# Patient Record
Sex: FEMALE | Race: OTHER | NOT HISPANIC OR LATINO | URBAN - METROPOLITAN AREA
[De-identification: names, ages, dates, MRNs, and addresses within clinical notes are randomized per-mention and may not be internally consistent; named-entity substitution may affect disease eponyms.]

---

## 2019-05-18 ENCOUNTER — EMERGENCY (EMERGENCY)
Facility: HOSPITAL | Age: 19
LOS: 1 days | Discharge: HOME | End: 2019-05-18
Attending: EMERGENCY MEDICINE | Admitting: EMERGENCY MEDICINE
Payer: COMMERCIAL

## 2019-05-18 VITALS
TEMPERATURE: 98 F | OXYGEN SATURATION: 99 % | RESPIRATION RATE: 16 BRPM | HEART RATE: 69 BPM | DIASTOLIC BLOOD PRESSURE: 71 MMHG | SYSTOLIC BLOOD PRESSURE: 118 MMHG

## 2019-05-18 DIAGNOSIS — Z04.89 ENCOUNTER FOR EXAMINATION AND OBSERVATION FOR OTHER SPECIFIED REASONS: ICD-10-CM

## 2019-05-18 DIAGNOSIS — Z04.41 ENCOUNTER FOR EXAMINATION AND OBSERVATION FOLLOWING ALLEGED ADULT RAPE: ICD-10-CM

## 2019-05-18 LAB
APPEARANCE UR: CLEAR — SIGNIFICANT CHANGE UP
BILIRUB UR-MCNC: NEGATIVE — SIGNIFICANT CHANGE UP
COLOR SPEC: YELLOW — SIGNIFICANT CHANGE UP
DIFF PNL FLD: ABNORMAL
GLUCOSE UR QL: NEGATIVE — SIGNIFICANT CHANGE UP
HCG UR QL: NEGATIVE — SIGNIFICANT CHANGE UP
KETONES UR-MCNC: NEGATIVE — SIGNIFICANT CHANGE UP
LEUKOCYTE ESTERASE UR-ACNC: NEGATIVE — SIGNIFICANT CHANGE UP
NITRITE UR-MCNC: NEGATIVE — SIGNIFICANT CHANGE UP
PH UR: 6.5 — SIGNIFICANT CHANGE UP (ref 5–8)
PROT UR-MCNC: NEGATIVE MG/DL — SIGNIFICANT CHANGE UP
SP GR SPEC: <=1.005 — SIGNIFICANT CHANGE UP (ref 1–1.03)
UROBILINOGEN FLD QL: 0.2 E.U./DL — SIGNIFICANT CHANGE UP

## 2019-05-18 PROCEDURE — 99283 EMERGENCY DEPT VISIT LOW MDM: CPT
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
## 2019-05-18 NOTE — ED PROVIDER NOTE - CHPI ED SYMPTOMS NEG
no chest pain/no weakness/no blurred vision/no abrasion/no back pain/no chest wall tenderness/no vomiting
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
## 2020-12-15 ENCOUNTER — EMERGENCY (EMERGENCY)
Facility: HOSPITAL | Age: 20
LOS: 1 days | Discharge: HOME | End: 2020-12-15
Admitting: EMERGENCY MEDICINE
Payer: COMMERCIAL

## 2020-12-15 VITALS
TEMPERATURE: 98 F | SYSTOLIC BLOOD PRESSURE: 110 MMHG | RESPIRATION RATE: 18 BRPM | HEART RATE: 58 BPM | OXYGEN SATURATION: 98 % | HEIGHT: 63 IN | DIASTOLIC BLOOD PRESSURE: 68 MMHG | WEIGHT: 134.92 LBS

## 2020-12-15 DIAGNOSIS — Z20.828 CONTACT WITH AND (SUSPECTED) EXPOSURE TO OTHER VIRAL COMMUNICABLE DISEASES: ICD-10-CM

## 2020-12-15 PROBLEM — Z78.9 OTHER SPECIFIED HEALTH STATUS: Chronic | Status: ACTIVE | Noted: 2019-05-18

## 2020-12-15 PROCEDURE — 99283 EMERGENCY DEPT VISIT LOW MDM: CPT
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
## 2020-12-15 NOTE — ED PROVIDER NOTE - CLINICAL SUMMARY MEDICAL DECISION MAKING FREE TEXT BOX
19 y/o F presents to the ED requesting to have screening testing done for COVID-19. Pt endorses she is asymptomatic at this time. On exam, Pt appears well and in no apparent distress. No vital sign derangements. No conversational dyspnea. Nasopharyngeal PCR has been obtained and Pt has been guided on how to obtain their results. General COVID-19 discharge instructions have been given and Pt agrees to receiving results electronically.

## 2020-12-15 NOTE — ED PROVIDER NOTE - PATIENT PORTAL LINK FT
You can access the FollowMyHealth Patient Portal offered by [facility] by registering at the following website: [email or web address]. By joining [employer]’s FollowMyHealth portal, you will also be able to view your health information using other applications (apps) compatible with our system.
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
## 2020-12-21 ENCOUNTER — EMERGENCY (EMERGENCY)
Facility: HOSPITAL | Age: 20
LOS: 1 days | Discharge: HOME | End: 2020-12-21
Attending: EMERGENCY MEDICINE | Admitting: EMERGENCY MEDICINE
Payer: COMMERCIAL

## 2020-12-21 VITALS
DIASTOLIC BLOOD PRESSURE: 71 MMHG | WEIGHT: 134.92 LBS | OXYGEN SATURATION: 98 % | TEMPERATURE: 98 F | HEART RATE: 75 BPM | SYSTOLIC BLOOD PRESSURE: 109 MMHG | RESPIRATION RATE: 18 BRPM | HEIGHT: 63 IN

## 2020-12-21 DIAGNOSIS — Z20.828 CONTACT WITH AND (SUSPECTED) EXPOSURE TO OTHER VIRAL COMMUNICABLE DISEASES: ICD-10-CM

## 2020-12-21 PROCEDURE — 99283 EMERGENCY DEPT VISIT LOW MDM: CPT

## 2020-12-21 NOTE — ED PROVIDER NOTE - PATIENT PORTAL LINK FT
You can access the FollowMyHealth Patient Portal offered by [facility] by registering at the following website: [email or web address]. By joining [employer]’s FollowMyHealth portal, you will also be able to view your health information using other applications (apps) compatible with our system.

## 2021-01-29 ENCOUNTER — EMERGENCY (EMERGENCY)
Facility: HOSPITAL | Age: 21
LOS: 1 days | Discharge: HOME | End: 2021-01-29
Admitting: EMERGENCY MEDICINE
Payer: COMMERCIAL

## 2021-01-29 VITALS
HEIGHT: 63 IN | DIASTOLIC BLOOD PRESSURE: 59 MMHG | RESPIRATION RATE: 19 BRPM | OXYGEN SATURATION: 99 % | HEART RATE: 53 BPM | SYSTOLIC BLOOD PRESSURE: 103 MMHG | WEIGHT: 134.92 LBS | TEMPERATURE: 98 F

## 2021-01-29 DIAGNOSIS — Z20.822 CONTACT WITH AND (SUSPECTED) EXPOSURE TO COVID-19: ICD-10-CM

## 2021-01-29 PROCEDURE — 99283 EMERGENCY DEPT VISIT LOW MDM: CPT

## 2021-01-29 NOTE — ED PROVIDER NOTE - PATIENT PORTAL LINK FT
You can access the FollowMyHealth Patient Portal offered by [facility] by registering at the following website: [email or web address]. By joining [employer]’s FollowMyHealth portal, you will also be able to view your health information using other applications (apps) compatible with our system.

## 2021-01-29 NOTE — ED PROVIDER NOTE - OBJECTIVE STATEMENT
19 y/o F presents to the ED requesting to have testing done for COVID-19. Pt endorses she is asymptomatic at this time. Pt denies fevers, chills, coughs, CP, and SOB.

## 2021-01-29 NOTE — ED ADULT TRIAGE NOTE - SPO2 (%)
I will START or STAY ON the medications listed below when I get home from the hospital:    ibuprofen 600 mg oral tablet  -- 1 tab(s) by mouth every 6 hours  -- Indication: For pain    acetaminophen 325 mg oral tablet  -- 3 tab(s) by mouth   -- Indication: For pain    dibucaine 1% topical ointment  -- 1 application on skin every 6 hours, As needed, Perineal discomfort  -- Indication: For topical analgesia    hydrocortisone 1% topical cream  -- 1 application on skin every 6 hours, As needed, Moderate Pain (4-6)  -- Indication: For topical analgesia    Prenatal Multivitamins Folic Acid 1 mg oral tablet  -- 1 tab(s) by mouth once a day  -- Indication: For pnv 99

## 2021-04-06 ENCOUNTER — EMERGENCY (EMERGENCY)
Facility: HOSPITAL | Age: 21
LOS: 1 days | Discharge: HOME | End: 2021-04-06
Attending: EMERGENCY MEDICINE | Admitting: EMERGENCY MEDICINE
Payer: COMMERCIAL

## 2021-04-06 VITALS
HEART RATE: 71 BPM | DIASTOLIC BLOOD PRESSURE: 75 MMHG | TEMPERATURE: 98 F | HEIGHT: 63 IN | RESPIRATION RATE: 15 BRPM | OXYGEN SATURATION: 98 % | SYSTOLIC BLOOD PRESSURE: 123 MMHG

## 2021-04-06 DIAGNOSIS — Z20.822 CONTACT WITH AND (SUSPECTED) EXPOSURE TO COVID-19: ICD-10-CM

## 2021-04-06 LAB
S PYO AG SPEC QL IA: NEGATIVE — SIGNIFICANT CHANGE UP
S PYO DNA THROAT QL NAA+PROBE: SIGNIFICANT CHANGE UP

## 2021-04-06 PROCEDURE — 99283 EMERGENCY DEPT VISIT LOW MDM: CPT

## 2021-04-06 RX ORDER — IBUPROFEN 200 MG
600 TABLET ORAL ONCE
Refills: 0 | Status: COMPLETED | OUTPATIENT
Start: 2021-04-06 | End: 2021-04-06

## 2021-04-06 RX ADMIN — Medication 600 MILLIGRAM(S): at 11:43

## 2021-04-06 NOTE — ED PROVIDER NOTE - OBJECTIVE STATEMENT
21 y/o F presents to the ED requesting to have testing done for COVID-19. Pt endorses she is asymptomatic at this time. Pt denies fevers, chills, coughs, CP, and SOB.

## 2021-04-06 NOTE — ED PROVIDER NOTE - PATIENT PORTAL LINK FT
You can access the FollowMyHealth Patient Portal offered by [facility] by registering at the following website: [email or web address]. By joining [employer]’s FollowMyHealth portal, you will also be able to view your health information using other applications (apps) compatible with our system. You can access the FollowMyHealth Patient Portal offered by [facility] by registering at the following website: [email or web address]. By joining [employer]’s FollowMyHealth portal, you will also be able to view your health information using other applications (apps) compatible with our system.

## 2021-04-07 LAB — SARS-COV-2 RNA SPEC QL NAA+PROBE: SIGNIFICANT CHANGE UP

## 2021-07-14 NOTE — ED PROVIDER NOTE - THROAT, MLM
From: Lili Lovett  To: Sary Mcgrath  Sent: 7/14/2021 2:21 PM CDT  Subject: Non-Urgent Medical Question    Kamilla CARLSON,  Would you please prescribe an iontophoresis machine for my excessive sweating?   There is a product called Dermadry that I'd love to try. Even if it reduces my sweating by 50% it'll change my life.  If you'd prefer I can fax you the product authorization form to fill out so it's super easy.  Thank you!  I've wanted one of these things for years and the price is finally coming down. It's on sale for $399 which would be worth every mony if it works.    Lili   uvula midline, no vesicles, no redness, and no oropharyngeal exudate.

## 2023-01-25 NOTE — ED ADULT TRIAGE NOTE - NSTRIAGECARE_GEN_A_ER
January 25, 2023      [facility]  [address] [place] [address]  Phone: [phone]  Fax: [phone]       Patient: Curtis Crockett   YOB: 1969  Date of Visit: 01/25/2023    To Whom It May Concern:    Naya Crockett  was at [facility] on 01/25/2023. The patient may return to work with no  Restrictions on 01/30/2023. If you have any questions or concerns, or if I can be of further assistance, please do not hesitate to contact me.    Sincerely,        Elsie Clark MA      Face Mask

## 2023-02-28 NOTE — ED ADULT TRIAGE NOTE - NSTRIAGECARE_GEN_A_ER
Face Mask Drysol Counseling:  I discussed with the patient the risks of drysol/aluminum chloride including but not limited to skin rash, itching, irritation, burning.

## 2023-04-10 NOTE — ED PROVIDER NOTE - DISPOSITION TYPE
Patient Education     Acute Bronchitis  Your healthcare provider has told you that you have acute bronchitis. Bronchitis is infection or inflammation of the airways in the lungs (bronchial tubes). Normally, air moves easily in and out of the airways. Bronchitis narrows the airways. This makes it harder for air to flow in and out of the lungs. This causes symptoms such as shortness of breath, coughing up yellow or green mucus, and wheezing.  Bronchitis can be acute or chronic. Acute means it happens quickly and goes away in a short time. Chronic means a condition lasts a long time and often comes back. Most people with acute bronchitis get better in 1 to 2 weeks.     What causes acute bronchitis?  Acute bronchitis is often caused by a virus such as a cold or the flu. In some cases, it may be caused by bacteria. Certain factors make it more likely for a cold or flu to turn into bronchitis. These include being very young, being elderly, having a heart or lung problem, or having a weak immune system. Cigarette smoking also makes bronchitis more likely.  When bronchitis develops, the airways become swollen. The airways may also become infected with bacteria. This is known as a secondary infection.  Symptoms of acute bronchitis  Symptoms can include:  Coughing with mucus  Wheezing  Feeling short of breath  Chest pain  Fever  Diagnosing acute bronchitis  Your healthcare provider will ask about your symptoms and health history. He or she will give you a physical exam. This will include listening to your lungs while you breathe. You may have a chest X-ray to look for infection in the lungs (pneumonia) if you have had a fever. You may also have blood tests to check for infection.  Treating acute bronchitis  Bronchitis usually goes away in 1 to 2 weeks without treatment. You can help feel better by:  Taking medicine as directed. Talk to your healthcare provider before taking any over-the-counter medicines (OTC). Some OTC  medicines help relieve inflammation in your bronchial tubes. They can also thin mucus. This makes it easier to cough up. Your healthcare provider may prescribe an inhaler to help open up the bronchial tubes. Most of the time, acute bronchitis is caused by a viral infection. Antibiotics are usually not prescribed for viral infections.  Drinking plenty of fluids, such as water, juice, or warm soup. Fluids loosen mucus so that you can cough it up. This helps you breathe more easily. Fluids also prevent dehydration.  Using a humidifier. This can help reduce coughing.  Getting plenty of rest  Not smoking. Also, don't let anyone else smoke in your home. In public places, move away from secondhand smoke.  Recovery and follow-up  Follow up with your doctor. You will likely feel better in 1 to 2 weeks. But you may have a dry cough for a longer time. Let your doctor know if you still have symptoms other than a dry cough after 2 weeks. Tell him or her if you get bronchial infections often.  Self-care tips  To get relief from your symptoms and prevent bronchitis:  Stop smoking. Stopping smoking is the most important step you can take to treat bronchitis. If you need help stopping smoking, talk with your healthcare provider.  Stay away from secondhand smoke and other irritants. Try to stay away from smoke, chemicals, fumes, and dust. Don’t let anyone smoke in your home. Stay indoors on smoggy days.  Prevent lung infections. Ask your healthcare provider about the flu and pneumonia vaccines. Take steps to prevent colds and other lung infections.  Wash your hands well. Wash your hands often with soap and water. Use hand  when you can’t wash your hands. Stay away from crowds during cold and flu season.  When to call your healthcare provider  Call the healthcare provider if you have any of these:  Fever of 100.4°F ( 38.0°C) or higher, or as directed by your healthcare provider  Symptoms that get worse, or new  symptoms  Breathing not getting better with treatments  Symptoms that don’t start to get better in 1 week  Xu last reviewed this educational content on 8/1/2019 © 2000-2020 [employer]. [address] [place] [address], [place] [address]. All rights reserved. This information is not intended as a substitute for professional medical care. Always follow your healthcare professional's instructions.            DISCHARGE

## 2024-02-01 NOTE — ED PROVIDER NOTE - OBJECTIVE STATEMENT
Presents to the ED for COVID testing.    Denies fever, chills, cough, SOB, chest pain, or diarrhea.  No known close contact with suspected or known COVID + persons.  No pertinent travel history
carlene/son

## 2024-12-14 ENCOUNTER — EMERGENCY (EMERGENCY)
Facility: HOSPITAL | Age: 24
LOS: 1 days | Discharge: HOME | End: 2024-12-14
Attending: EMERGENCY MEDICINE
Payer: COMMERCIAL

## 2024-12-14 VITALS
RESPIRATION RATE: 16 BRPM | HEART RATE: 67 BPM | TEMPERATURE: 98 F | OXYGEN SATURATION: 100 % | SYSTOLIC BLOOD PRESSURE: 111 MMHG | DIASTOLIC BLOOD PRESSURE: 80 MMHG

## 2024-12-14 VITALS
DIASTOLIC BLOOD PRESSURE: 64 MMHG | SYSTOLIC BLOOD PRESSURE: 102 MMHG | RESPIRATION RATE: 16 BRPM | OXYGEN SATURATION: 98 % | TEMPERATURE: 98 F | HEART RATE: 60 BPM

## 2024-12-14 LAB
ALBUMIN SERPL ELPH-MCNC: 4.1 G/DL — SIGNIFICANT CHANGE UP (ref 3.4–5)
ALP SERPL-CCNC: 48 U/L — SIGNIFICANT CHANGE UP (ref 40–120)
ALT FLD-CCNC: 20 U/L — SIGNIFICANT CHANGE UP (ref 12–42)
ANION GAP SERPL CALC-SCNC: 3 MMOL/L — LOW (ref 9–16)
AST SERPL-CCNC: 20 U/L — SIGNIFICANT CHANGE UP (ref 15–37)
BASOPHILS # BLD AUTO: 0.02 K/UL — SIGNIFICANT CHANGE UP (ref 0–0.2)
BASOPHILS NFR BLD AUTO: 0.5 % — SIGNIFICANT CHANGE UP (ref 0–2)
BILIRUB SERPL-MCNC: 0.6 MG/DL — SIGNIFICANT CHANGE UP (ref 0.2–1.2)
BUN SERPL-MCNC: 8 MG/DL — SIGNIFICANT CHANGE UP (ref 7–23)
CALCIUM SERPL-MCNC: 9.4 MG/DL — SIGNIFICANT CHANGE UP (ref 8.5–10.5)
CHLORIDE SERPL-SCNC: 107 MMOL/L — SIGNIFICANT CHANGE UP (ref 96–108)
CO2 SERPL-SCNC: 31 MMOL/L — SIGNIFICANT CHANGE UP (ref 22–31)
CREAT SERPL-MCNC: 0.73 MG/DL — SIGNIFICANT CHANGE UP (ref 0.5–1.3)
D DIMER BLD IA.RAPID-MCNC: <187 NG/ML DDU — SIGNIFICANT CHANGE UP
EGFR: 117 ML/MIN/1.73M2 — SIGNIFICANT CHANGE UP
EOSINOPHIL # BLD AUTO: 0.09 K/UL — SIGNIFICANT CHANGE UP (ref 0–0.5)
EOSINOPHIL NFR BLD AUTO: 2.1 % — SIGNIFICANT CHANGE UP (ref 0–6)
FLUAV AG NPH QL: SIGNIFICANT CHANGE UP
FLUBV AG NPH QL: SIGNIFICANT CHANGE UP
GLUCOSE SERPL-MCNC: 85 MG/DL — SIGNIFICANT CHANGE UP (ref 70–99)
HCG UR QL: NEGATIVE — SIGNIFICANT CHANGE UP
HCT VFR BLD CALC: 41.2 % — SIGNIFICANT CHANGE UP (ref 34.5–45)
HGB BLD-MCNC: 13.7 G/DL — SIGNIFICANT CHANGE UP (ref 11.5–15.5)
IMM GRANULOCYTES NFR BLD AUTO: 0 % — SIGNIFICANT CHANGE UP (ref 0–0.9)
LYMPHOCYTES # BLD AUTO: 1.56 K/UL — SIGNIFICANT CHANGE UP (ref 1–3.3)
LYMPHOCYTES # BLD AUTO: 36.4 % — SIGNIFICANT CHANGE UP (ref 13–44)
MCHC RBC-ENTMCNC: 28.9 PG — SIGNIFICANT CHANGE UP (ref 27–34)
MCHC RBC-ENTMCNC: 33.3 G/DL — SIGNIFICANT CHANGE UP (ref 32–36)
MCV RBC AUTO: 86.9 FL — SIGNIFICANT CHANGE UP (ref 80–100)
MONOCYTES # BLD AUTO: 0.31 K/UL — SIGNIFICANT CHANGE UP (ref 0–0.9)
MONOCYTES NFR BLD AUTO: 7.2 % — SIGNIFICANT CHANGE UP (ref 2–14)
NEUTROPHILS # BLD AUTO: 2.31 K/UL — SIGNIFICANT CHANGE UP (ref 1.8–7.4)
NEUTROPHILS NFR BLD AUTO: 53.8 % — SIGNIFICANT CHANGE UP (ref 43–77)
NRBC # BLD: 0 /100 WBCS — SIGNIFICANT CHANGE UP (ref 0–0)
PLATELET # BLD AUTO: 272 K/UL — SIGNIFICANT CHANGE UP (ref 150–400)
POTASSIUM SERPL-MCNC: 4.1 MMOL/L — SIGNIFICANT CHANGE UP (ref 3.5–5.3)
POTASSIUM SERPL-SCNC: 4.1 MMOL/L — SIGNIFICANT CHANGE UP (ref 3.5–5.3)
PROT SERPL-MCNC: 7.1 G/DL — SIGNIFICANT CHANGE UP (ref 6.4–8.2)
RBC # BLD: 4.74 M/UL — SIGNIFICANT CHANGE UP (ref 3.8–5.2)
RBC # FLD: 13 % — SIGNIFICANT CHANGE UP (ref 10.3–14.5)
RSV RNA NPH QL NAA+NON-PROBE: SIGNIFICANT CHANGE UP
SARS-COV-2 RNA SPEC QL NAA+PROBE: SIGNIFICANT CHANGE UP
SODIUM SERPL-SCNC: 141 MMOL/L — SIGNIFICANT CHANGE UP (ref 132–145)
TROPONIN I, HIGH SENSITIVITY RESULT: 5.1 NG/L — SIGNIFICANT CHANGE UP
WBC # BLD: 4.29 K/UL — SIGNIFICANT CHANGE UP (ref 3.8–10.5)
WBC # FLD AUTO: 4.29 K/UL — SIGNIFICANT CHANGE UP (ref 3.8–10.5)

## 2024-12-14 PROCEDURE — 71046 X-RAY EXAM CHEST 2 VIEWS: CPT | Mod: 26

## 2024-12-14 PROCEDURE — 99285 EMERGENCY DEPT VISIT HI MDM: CPT

## 2024-12-14 RX ORDER — IBUPROFEN 200 MG
600 TABLET ORAL ONCE
Refills: 0 | Status: COMPLETED | OUTPATIENT
Start: 2024-12-14 | End: 2024-12-14

## 2024-12-14 NOTE — ED ADULT NURSE NOTE - OBJECTIVE STATEMENT
Patient A+Ox3, responsive to all stimuli, moving all extremities, airway patent, breathing even and unlabored. Reports midsternal chest pain, refusing pain meds at this time. Denies PMH. NKA. No acute distress noted. VSS.

## 2024-12-14 NOTE — ED PROVIDER NOTE - CARE PLAN
1 Principal Discharge DX:	Chest pain   Principal Discharge DX:	Chest pain  Assessment and plan of treatment:	IMPRESSION: 24-year-old female no sig PMH presents with sharp stabbing chest pain likely MSK vs costochondritis vs pleurisy. Less likely acute pericarditis. Low suspicion for ACS. Low risk for PE with no VTE risk factors. No clinical evidence of and low clinical concern for other emergent medical conditions at this time    PLAN:    # TESTS: UCG, EKG  # LABS: CBC, CMP, Trop, d-dimer, COVID / flu  # IMAGING: CXR  # MEDICATIONS / THERAPEUTICS: Analgesia w/ APAP, NSAIDs  Further management and disposition as per ED work-up and clinical course.  # DISPOSITION:  Disposition decision determined by considering the complexity and severity of the patient presentation, comorbidities, current medical needs, the risk of an adverse outcome(s), and/or other social determinates of health.  If ED work-up unremarkable and patient symptomatically improved, likely okay for discharge with urgent outpatient follow-up.  - Rx: Analgesia w/ APAP, NSAIDs  - Instructions: Chest pain  - F/U: PCP

## 2024-12-14 NOTE — ED PROVIDER NOTE - IV ALTEPLASE DOOR HIDDEN
LM to schedule appointment
Please contact patient to schedule:  Return in about 6 months (around 5/10/2023) for Diabetes, Hyperlipidemia.   Also needs AWV scheduled- due 3/24/23    Thank you
Scheduled 5-09-23
show

## 2024-12-14 NOTE — ED PROVIDER NOTE - OBJECTIVE STATEMENT
24-year-old female presents with sharp stabbing chest pain this morning. No shortness of breath. No radiation of pain. No leg swelling. No VTE risk factors. Has had recent URI symptoms.

## 2024-12-14 NOTE — ED PROVIDER NOTE - PATIENT PORTAL LINK FT
You can access the FollowMyHealth Patient Portal offered by [facility] by registering at the following website: [email or web address]. By joining [employer]’s FollowMyHealth portal, you will also be able to view your health information using other applications (apps) compatible with our system.
Cardiac

## 2024-12-14 NOTE — ED PROVIDER NOTE - PLAN OF CARE
IMPRESSION: 24-year-old female no sig PMH presents with sharp stabbing chest pain likely MSK vs costochondritis vs pleurisy. Less likely acute pericarditis. Low suspicion for ACS. Low risk for PE with no VTE risk factors. No clinical evidence of and low clinical concern for other emergent medical conditions at this time    PLAN:    # TESTS: UCG, EKG  # LABS: CBC, CMP, Trop, d-dimer, COVID / flu  # IMAGING: CXR  # MEDICATIONS / THERAPEUTICS: Analgesia w/ APAP, NSAIDs  Further management and disposition as per ED work-up and clinical course.  # DISPOSITION:  Disposition decision determined by considering the complexity and severity of the patient presentation, comorbidities, current medical needs, the risk of an adverse outcome(s), and/or other social determinates of health.  If ED work-up unremarkable and patient symptomatically improved, likely okay for discharge with urgent outpatient follow-up.  - Rx: Analgesia w/ APAP, NSAIDs  - Instructions: Chest pain  - F/U: PCP

## 2024-12-14 NOTE — ED PROVIDER NOTE - NSFOLLOWUPINSTRUCTIONS_ED_ALL_ED_FT
Take Tylenol or ibuprofen as directed for pain.    Follow-up with your doctor within 5 to 7 days or sooner if symptoms persist or worsen.    Return to the ER for chest pain, shortness of breath, weakness, fevers, chills, continued or worsening symptoms or for any concerns.    ----    Chest Pain    WHAT YOU NEED TO KNOW:    Chest pain can be caused by a range of conditions, from not serious to life-threatening. Chest pain can be a symptom of a digestive problem, such as acid reflux or a stomach ulcer. An anxiety attack or a strong emotion, such as anger, can also cause chest pain. Infection, inflammation, or a fracture in the bones or cartilage in your chest can cause pain or discomfort. Sometimes chest pain or pressure is caused by poor blood flow to your heart (angina). Chest pain may also be caused by life-threatening conditions such as a heart attack or blood clot in your lungs.    DISCHARGE INSTRUCTIONS:    Call your local emergency number (911 in the ) or have someone call if:    You have any of the following signs of a heart attack:  Squeezing, pressure, or pain in your chest    You may also have any of the following:  Discomfort or pain in your back, neck, jaw, stomach, or arm    Shortness of breath    Nausea or vomiting    Lightheadedness or a sudden cold sweat    Return to the emergency department if:    You have chest discomfort that gets worse, even with medicine.    You cough or vomit blood.    Your bowel movements are black or bloody.    You cannot stop vomiting, or it hurts to swallow.  Call your doctor if:    You have questions or concerns about your condition or care.    Medicines:    Medicines may be given to treat the cause of your chest pain. Examples include pain medicine, anxiety medicine, or medicines to increase blood flow to your heart.    Do not take certain medicines without asking your healthcare provider first. These include NSAIDs, herbal or vitamin supplements, and hormones, such as estrogen or progestin.    Take your medicine as directed. Contact your healthcare provider if you think your medicine is not helping or if you have side effects. Tell your provider if you are allergic to any medicine. Keep a list of the medicines, vitamins, and herbs you take. Include the amounts, and when and why you take them. Bring the list or the pill bottles to follow-up visits. Carry your medicine list with you in case of an emergency.  Healthy living tips: If the cause of your chest pain is known, your healthcare provider will give you specific guidelines to follow. The following are general healthy guidelines:    Do not smoke. Nicotine and other chemicals in cigarettes and cigars can cause lung and heart damage. Ask your healthcare provider for information if you currently smoke and need help to quit. E-cigarettes or smokeless tobacco still contain nicotine. Talk to your healthcare provider before you use these products.    Choose a variety of healthy foods as often as possible. Include fresh, frozen, or canned fruits and vegetables. Also include low-fat dairy products, fish, chicken (without skin), and lean meats. Your healthcare provider or a dietitian can help you create meal plans. You may need to avoid certain foods or drinks if your pain is caused by a digestion problem.  Healthy Foods      Lower your sodium (salt) intake. Limit foods that are high in sodium, such as canned foods, salty snacks, and cold cuts. If you add salt when you cook food, do not add more at the table. Choose low-sodium canned foods as much as possible.        Drink plenty of water every day. Water helps your body to control your temperature and blood pressure. Ask your healthcare provider how much water you should drink every day.    Ask about activity. Your healthcare provider will tell you which activities to limit or avoid. Ask when you can drive, return to work, and have sex. Ask about the best exercise plan for you.    Maintain a healthy weight. Ask your healthcare provider what a healthy weight is for you. Ask him or her to help you create a safe weight loss plan if you are overweight.    Ask about vaccines you may need. Your healthcare provider can tell you which vaccines you need, and when to get them. The following vaccines help prevent diseases that can become serious for a person with a heart condition:  The influenza (flu) vaccine is given each year. Get a flu vaccine as soon as recommended, usually in September or October.  Prevent Heart Disease   Follow up with your doctor within 72 hours, or as directed: You may need to return for more tests to find the cause of your chest pain. You may be referred to a specialist, such as a cardiologist or gastroenterologist. Write down your questions so you remember to ask them during your visits.

## 2024-12-14 NOTE — ED PROVIDER NOTE - PHYSICAL EXAMINATION
Gen: WD WN NAD  HEENT: NCAT, PERRL EOMI O/P: MMM  Neck: supple, NT   CV: RRR no M/R/G noted  Chest: NT  Resp: CTAB no R/R/W noted  Abd: soft NT/ND BS nl  Ext: No edema, 2+ pulses  Neuro: AAOx3 grossly intact without focality

## 2024-12-17 DIAGNOSIS — R07.89 OTHER CHEST PAIN: ICD-10-CM
